# Patient Record
Sex: FEMALE | Race: WHITE | Employment: OTHER | URBAN - METROPOLITAN AREA
[De-identification: names, ages, dates, MRNs, and addresses within clinical notes are randomized per-mention and may not be internally consistent; named-entity substitution may affect disease eponyms.]

---

## 2018-09-17 RX ORDER — SULFAMETHOXAZOLE/TRIMETHOPRIM 800-160 MG
1 TABLET ORAL 2 TIMES DAILY
COMMUNITY

## 2018-09-17 RX ORDER — BETAMETHASONE DIPROPIONATE 0.5 MG/G
CREAM TOPICAL 2 TIMES DAILY
COMMUNITY

## 2018-09-17 RX ORDER — PENICILLIN V POTASSIUM 250 MG/1
250 TABLET, FILM COATED ORAL 4 TIMES DAILY
COMMUNITY
End: 2018-09-17

## 2018-09-17 RX ORDER — FERROUS SULFATE 325(65) MG
325 TABLET ORAL
COMMUNITY

## 2018-09-17 RX ORDER — MULTIVITAMIN,THERAPEUTIC
1 TABLET ORAL DAILY
COMMUNITY

## 2018-09-18 ENCOUNTER — HOSPITAL ENCOUNTER (OUTPATIENT)
Facility: CLINIC | Age: 73
Discharge: HOME | End: 2018-09-18
Attending: DENTIST | Admitting: DENTIST
Payer: MEDICARE

## 2018-09-18 ENCOUNTER — ANESTHESIA EVENT (OUTPATIENT)
Dept: SURGERY | Facility: CLINIC | Age: 73
End: 2018-09-18
Payer: MEDICARE

## 2018-09-18 ENCOUNTER — ANESTHESIA (OUTPATIENT)
Dept: SURGERY | Facility: CLINIC | Age: 73
End: 2018-09-18
Payer: MEDICARE

## 2018-09-18 VITALS
HEIGHT: 66 IN | SYSTOLIC BLOOD PRESSURE: 126 MMHG | OXYGEN SATURATION: 95 % | RESPIRATION RATE: 16 BRPM | DIASTOLIC BLOOD PRESSURE: 63 MMHG | HEART RATE: 63 BPM | BODY MASS INDEX: 42.41 KG/M2 | TEMPERATURE: 98.2 F | WEIGHT: 263.89 LBS

## 2018-09-18 DIAGNOSIS — K14.9 DYSPLASIA OF TONGUE: Primary | ICD-10-CM

## 2018-09-18 LAB
CREAT SERPL-MCNC: 0.95 MG/DL (ref 0.52–1.04)
GFR SERPL CREATININE-BSD FRML MDRD: 58 ML/MIN/1.7M2
GLUCOSE BLDC GLUCOMTR-MCNC: 98 MG/DL (ref 70–99)
POTASSIUM SERPL-SCNC: 4.8 MMOL/L (ref 3.4–5.3)

## 2018-09-18 PROCEDURE — 36415 COLL VENOUS BLD VENIPUNCTURE: CPT | Performed by: ANESTHESIOLOGY

## 2018-09-18 PROCEDURE — 82565 ASSAY OF CREATININE: CPT | Performed by: ANESTHESIOLOGY

## 2018-09-18 PROCEDURE — 25000125 ZZHC RX 250: Performed by: NURSE ANESTHETIST, CERTIFIED REGISTERED

## 2018-09-18 PROCEDURE — 37000009 ZZH ANESTHESIA TECHNICAL FEE, EACH ADDTL 15 MIN: Performed by: DENTIST

## 2018-09-18 PROCEDURE — 25000125 ZZHC RX 250

## 2018-09-18 PROCEDURE — 25000566 ZZH SEVOFLURANE, EA 15 MIN: Performed by: DENTIST

## 2018-09-18 PROCEDURE — A9270 NON-COVERED ITEM OR SERVICE: HCPCS | Mod: GY

## 2018-09-18 PROCEDURE — 25000132 ZZH RX MED GY IP 250 OP 250 PS 637: Mod: GY | Performed by: DENTIST

## 2018-09-18 PROCEDURE — 37000008 ZZH ANESTHESIA TECHNICAL FEE, 1ST 30 MIN: Performed by: DENTIST

## 2018-09-18 PROCEDURE — 25000128 H RX IP 250 OP 636

## 2018-09-18 PROCEDURE — 71000014 ZZH RECOVERY PHASE 1 LEVEL 2 FIRST HR: Performed by: DENTIST

## 2018-09-18 PROCEDURE — 25000128 H RX IP 250 OP 636: Performed by: NURSE ANESTHETIST, CERTIFIED REGISTERED

## 2018-09-18 PROCEDURE — 71000027 ZZH RECOVERY PHASE 2 EACH 15 MINS: Performed by: DENTIST

## 2018-09-18 PROCEDURE — 36000051 ZZH SURGERY LEVEL 2 1ST 30 MIN - UMMC: Performed by: DENTIST

## 2018-09-18 PROCEDURE — 27210794 ZZH OR GENERAL SUPPLY STERILE: Performed by: DENTIST

## 2018-09-18 PROCEDURE — 25000132 ZZH RX MED GY IP 250 OP 250 PS 637: Mod: GY

## 2018-09-18 PROCEDURE — 82962 GLUCOSE BLOOD TEST: CPT

## 2018-09-18 PROCEDURE — 25000128 H RX IP 250 OP 636: Performed by: ANESTHESIOLOGY

## 2018-09-18 PROCEDURE — 88305 TISSUE EXAM BY PATHOLOGIST: CPT | Performed by: DENTIST

## 2018-09-18 PROCEDURE — 36000053 ZZH SURGERY LEVEL 2 EA 15 ADDTL MIN - UMMC: Performed by: DENTIST

## 2018-09-18 PROCEDURE — A9270 NON-COVERED ITEM OR SERVICE: HCPCS | Mod: GY | Performed by: DENTIST

## 2018-09-18 PROCEDURE — 84132 ASSAY OF SERUM POTASSIUM: CPT | Performed by: ANESTHESIOLOGY

## 2018-09-18 PROCEDURE — 40000170 ZZH STATISTIC PRE-PROCEDURE ASSESSMENT II: Performed by: DENTIST

## 2018-09-18 RX ORDER — CEFAZOLIN SODIUM 1 G/3ML
1 INJECTION, POWDER, FOR SOLUTION INTRAMUSCULAR; INTRAVENOUS SEE ADMIN INSTRUCTIONS
Status: DISCONTINUED | OUTPATIENT
Start: 2018-09-18 | End: 2018-09-18 | Stop reason: HOSPADM

## 2018-09-18 RX ORDER — ACETAMINOPHEN 500 MG
500 TABLET ORAL EVERY 4 HOURS PRN
Qty: 30 TABLET | Refills: 0 | Status: SHIPPED | OUTPATIENT
Start: 2018-09-18

## 2018-09-18 RX ORDER — ONDANSETRON 2 MG/ML
INJECTION INTRAMUSCULAR; INTRAVENOUS PRN
Status: DISCONTINUED | OUTPATIENT
Start: 2018-09-18 | End: 2018-09-18

## 2018-09-18 RX ORDER — FENTANYL CITRATE 50 UG/ML
INJECTION, SOLUTION INTRAMUSCULAR; INTRAVENOUS PRN
Status: DISCONTINUED | OUTPATIENT
Start: 2018-09-18 | End: 2018-09-18

## 2018-09-18 RX ORDER — MEPERIDINE HYDROCHLORIDE 50 MG/ML
12.5 INJECTION INTRAMUSCULAR; INTRAVENOUS; SUBCUTANEOUS
Status: DISCONTINUED | OUTPATIENT
Start: 2018-09-18 | End: 2018-09-18 | Stop reason: HOSPADM

## 2018-09-18 RX ORDER — NALOXONE HYDROCHLORIDE 0.4 MG/ML
.1-.4 INJECTION, SOLUTION INTRAMUSCULAR; INTRAVENOUS; SUBCUTANEOUS
Status: DISCONTINUED | OUTPATIENT
Start: 2018-09-18 | End: 2018-09-18 | Stop reason: HOSPADM

## 2018-09-18 RX ORDER — IBUPROFEN 800 MG/1
800 TABLET, FILM COATED ORAL EVERY 6 HOURS PRN
Qty: 30 TABLET | Refills: 0 | Status: SHIPPED | OUTPATIENT
Start: 2018-09-18

## 2018-09-18 RX ORDER — SODIUM CHLORIDE, SODIUM LACTATE, POTASSIUM CHLORIDE, CALCIUM CHLORIDE 600; 310; 30; 20 MG/100ML; MG/100ML; MG/100ML; MG/100ML
INJECTION, SOLUTION INTRAVENOUS CONTINUOUS
Status: DISCONTINUED | OUTPATIENT
Start: 2018-09-18 | End: 2018-09-18 | Stop reason: HOSPADM

## 2018-09-18 RX ORDER — CHLORHEXIDINE GLUCONATE ORAL RINSE 1.2 MG/ML
SOLUTION DENTAL PRN
Status: DISCONTINUED | OUTPATIENT
Start: 2018-09-18 | End: 2018-09-18 | Stop reason: HOSPADM

## 2018-09-18 RX ORDER — ONDANSETRON 4 MG/1
4 TABLET, ORALLY DISINTEGRATING ORAL EVERY 30 MIN PRN
Status: DISCONTINUED | OUTPATIENT
Start: 2018-09-18 | End: 2018-09-18 | Stop reason: HOSPADM

## 2018-09-18 RX ORDER — CHLORHEXIDINE GLUCONATE ORAL RINSE 1.2 MG/ML
10 SOLUTION DENTAL ONCE
Status: COMPLETED | OUTPATIENT
Start: 2018-09-18 | End: 2018-09-18

## 2018-09-18 RX ORDER — PROPOFOL 10 MG/ML
INJECTION, EMULSION INTRAVENOUS PRN
Status: DISCONTINUED | OUTPATIENT
Start: 2018-09-18 | End: 2018-09-18

## 2018-09-18 RX ORDER — LIDOCAINE 40 MG/G
CREAM TOPICAL
Status: DISCONTINUED | OUTPATIENT
Start: 2018-09-18 | End: 2018-09-18 | Stop reason: HOSPADM

## 2018-09-18 RX ORDER — LABETALOL HYDROCHLORIDE 5 MG/ML
10 INJECTION, SOLUTION INTRAVENOUS
Status: DISCONTINUED | OUTPATIENT
Start: 2018-09-18 | End: 2018-09-18 | Stop reason: HOSPADM

## 2018-09-18 RX ORDER — OXYCODONE HYDROCHLORIDE 5 MG/1
5-10 TABLET ORAL EVERY 6 HOURS PRN
Qty: 16 TABLET | Refills: 0 | Status: SHIPPED | OUTPATIENT
Start: 2018-09-18

## 2018-09-18 RX ORDER — DEXAMETHASONE SODIUM PHOSPHATE 4 MG/ML
INJECTION, SOLUTION INTRA-ARTICULAR; INTRALESIONAL; INTRAMUSCULAR; INTRAVENOUS; SOFT TISSUE PRN
Status: DISCONTINUED | OUTPATIENT
Start: 2018-09-18 | End: 2018-09-18

## 2018-09-18 RX ORDER — ONDANSETRON 2 MG/ML
4 INJECTION INTRAMUSCULAR; INTRAVENOUS EVERY 30 MIN PRN
Status: DISCONTINUED | OUTPATIENT
Start: 2018-09-18 | End: 2018-09-18 | Stop reason: HOSPADM

## 2018-09-18 RX ORDER — CEFAZOLIN SODIUM 2 G/100ML
2 INJECTION, SOLUTION INTRAVENOUS
Status: COMPLETED | OUTPATIENT
Start: 2018-09-18 | End: 2018-09-18

## 2018-09-18 RX ORDER — LIDOCAINE HYDROCHLORIDE 20 MG/ML
INJECTION, SOLUTION INFILTRATION; PERINEURAL PRN
Status: DISCONTINUED | OUTPATIENT
Start: 2018-09-18 | End: 2018-09-18

## 2018-09-18 RX ORDER — HYDROMORPHONE HYDROCHLORIDE 1 MG/ML
.3-.5 INJECTION, SOLUTION INTRAMUSCULAR; INTRAVENOUS; SUBCUTANEOUS EVERY 10 MIN PRN
Status: DISCONTINUED | OUTPATIENT
Start: 2018-09-18 | End: 2018-09-18 | Stop reason: HOSPADM

## 2018-09-18 RX ADMIN — LIDOCAINE HYDROCHLORIDE 100 MG: 20 INJECTION, SOLUTION INFILTRATION; PERINEURAL at 13:13

## 2018-09-18 RX ADMIN — CEFAZOLIN SODIUM 2 G: 2 INJECTION, SOLUTION INTRAVENOUS at 13:30

## 2018-09-18 RX ADMIN — MIDAZOLAM 1 MG: 1 INJECTION INTRAMUSCULAR; INTRAVENOUS at 13:04

## 2018-09-18 RX ADMIN — MIDAZOLAM 1 MG: 1 INJECTION INTRAMUSCULAR; INTRAVENOUS at 13:11

## 2018-09-18 RX ADMIN — DEXAMETHASONE SODIUM PHOSPHATE 4 MG: 4 INJECTION, SOLUTION INTRA-ARTICULAR; INTRALESIONAL; INTRAMUSCULAR; INTRAVENOUS; SOFT TISSUE at 13:13

## 2018-09-18 RX ADMIN — PROPOFOL 130 MG: 10 INJECTION, EMULSION INTRAVENOUS at 13:13

## 2018-09-18 RX ADMIN — FENTANYL CITRATE 25 MCG: 50 INJECTION, SOLUTION INTRAMUSCULAR; INTRAVENOUS at 13:13

## 2018-09-18 RX ADMIN — ROCURONIUM BROMIDE 50 MG: 10 INJECTION INTRAVENOUS at 13:13

## 2018-09-18 RX ADMIN — ONDANSETRON 4 MG: 2 INJECTION INTRAMUSCULAR; INTRAVENOUS at 14:02

## 2018-09-18 RX ADMIN — CHLORHEXIDINE GLUCONATE 10 ML: 1.2 RINSE ORAL at 12:38

## 2018-09-18 RX ADMIN — SODIUM CHLORIDE, POTASSIUM CHLORIDE, SODIUM LACTATE AND CALCIUM CHLORIDE: 600; 310; 30; 20 INJECTION, SOLUTION INTRAVENOUS at 13:04

## 2018-09-18 RX ADMIN — SUGAMMADEX 200 MG: 100 INJECTION, SOLUTION INTRAVENOUS at 14:02

## 2018-09-18 RX ADMIN — FENTANYL CITRATE 50 MCG: 50 INJECTION, SOLUTION INTRAMUSCULAR; INTRAVENOUS at 13:36

## 2018-09-18 NOTE — BRIEF OP NOTE
[facility], [place]    Brief Operative Note    Pre-operative diagnosis: Dysplasia of Right Lateral Tongue   Post-operative diagnosis * No post-op diagnosis entered *  Procedure: Procedure(s):  Excision Biopsy Right Lateral Tongue  - Wound Class: II-Clean Contaminated  Surgeon: Surgeon(s) and Role:     * Bulmaro Hutchins DDS - Primary     * Fransisco Castillo MD  Anesthesia: General   Estimated blood loss: Less than 10 ml  Drains: None  Specimens:   ID Type Source Tests Collected by Time Destination   A : Right Lateral tongue Tissue Tongue SURGICAL PATHOLOGY EXAM Bulmaro Hutchins DDS 9/18/2018  1:48 PM      Findings:   None.  Complications: None.  Implants: None.

## 2018-09-18 NOTE — OP NOTE
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      DRAINS:  None.      IMPLANTS:  None      INDICATIONS FOR OPERATION: Gege Lemon is a 73 year old woman with history of epithelial dysplasia of the right lateral border of the tongue who was referred to the [facility] clinic for definitive treatment.  She has previously had the lesion biopsied with subsequent CO2 laser ablation two times. After evaluation of the patient clinically as well as radiographically, it was recommended to the patient that he be taken to the operating room under general anesthesia for extraction of her teeth. The risks and benefits of the procedure were extensively discussed with the patient including but not limited to, bleeding, bruising, postoperative pain, infection, damage to the lingual nerve, damage to adjacent structures, and need for additional procedures in the future. After a thorough discussion of the risks and benefits, the patient expressed understanding and consented to the procedure.       DESCRIPTION OF PROCEDURE:  The patient was met in the preoperative holding area on 9/18/18.  Again, the risks and benefits were discussed with the patient and signed written and verbal consent was obtained.  The patient was then taken to the operating room #3 by the anesthesia service.  The patient was then transferred from the hospitals to the  hospital bed and placed in supine position.  The patient was appropriately padded.  All standard ASA monitors were applied.  The patient then was then induced by the Anesthesia service and an oral endotracheal tube was placed without complication.  Airway was confirmed by the anesthesiologist by end tidal CO2, fog in the tube, and bilateral auscultation of lungs. The airway was secured by the Oral and Maxillofacial Surgery service. Then a mini time-out was conducted and then the patient was prepped, cleaned the oral cavity with chlorhexidine rinse, and received local anesthesia as previously mentioned. Surgeons left to scrub and returned to don sterile gown and gloves. The patient was prepped in standard fashion. A formal time-out was conducted per [facility] protocols.        Attention was directed to the right lateral border of the tongue.  Lesion appeared as a white plaque with irregular borders, non tender to palpation, measuring approximately 4cm x 2cm.  Tongue retracted laterally.  15 blade used to make ellipse incision through mucosa only encompassing lesion, distal of lesion was elevated and lesion was excised from tongue and placed in a formalin container for permanent histologic analysis.  CO2 laser was set to 5 spann continuous.  Intraoral mucosa packed with moist gauze, face covered with wet towels.  Laser taken off standby, laser used to ablate right lateral border of tongue within margins of ellipse.  Laser was coursed over lesion three times in different orientations.  After each complete pass, eschar was removed.  Eschar was left in place on last attempt to aid in hemostasis.  At conclusion of procedure site was copiously irrigated with sterile saline and noted to be hemostatic.      The patient was then turned back over to the anesthesia service where the patient was extubated without complications and transferred to the post anesthesia care unit in stable condition. All  counts were correct.      Dr. Hutchins was present and scrubbed for the entire procedure       Fransisco Castillo DDS

## 2018-09-18 NOTE — ANESTHESIA POSTPROCEDURE EVALUATION
Patient: Gege Lemon    Procedure(s):  Excision Biopsy Right Lateral Tongue with CO2 laser. - Wound Class: II-Clean Contaminated    Diagnosis:Dysplasia of Right Lateral Tongue   Diagnosis Additional Information: No value filed.    Anesthesia Type:  General, ETT    Note:  Anesthesia Post Evaluation    Patient location during evaluation: PACU  Patient participation: Able to fully participate in evaluation  Level of consciousness: awake and alert  Pain management: adequate  Airway patency: patent  Cardiovascular status: acceptable  Respiratory status: acceptable  Hydration status: acceptable  PONV: none     Anesthetic complications: None          Last vitals:  Vitals:    09/18/18 1415 09/18/18 1430 09/18/18 1445   BP: (!) 173/91 154/76 146/70   Pulse: 63     Resp: 14 16 18   Temp: 36.8  C (98.2  F)  36.9  C (98.4  F)   SpO2: 97% 96% 93%         Electronically Signed By: Diana Herron MD  September 18, 2018  3:06 PM

## 2018-09-18 NOTE — ANESTHESIA PREPROCEDURE EVALUATION
Anesthesia Evaluation     . Pt has had prior anesthetic. Type: General and Regional    No history of anesthetic complications          ROS/MED HX    ENT/Pulmonary:     (+)sleep apnea, doesn't use CPAP uses dental device to extend jaw cmH2O , . .    Neurologic:  - neg neurologic ROS     Cardiovascular:     (+) hypertension (on atenolol)----. : . . . :. .       METS/Exercise Tolerance:     Hematologic:  - neg hematologic  ROS       Musculoskeletal:   (+) , , other musculoskeletal- bilateral knee replacements      GI/Hepatic:     (+) hiatal hernia,       Renal/Genitourinary:         Endo:         Psychiatric:  - neg psychiatric ROS       Infectious Disease:  - neg infectious disease ROS       Malignancy:      - no malignancy   Other:    - neg other ROS                 Physical Exam  Normal systems: pulmonary and dental    Airway   Mallampati: III  TM distance: >3 FB  Neck ROM: full    Dental     Cardiovascular   Rhythm and rate: regular and normal      Pulmonary                     Anesthesia Plan      History & Physical Review  History and physical reviewed and following examination; no interval change.    ASA Status:  3 .    NPO Status:  > 2 hours    Plan for General and ETT with Intravenous induction. Maintenance will be Balanced.           Postoperative Care  Postoperative pain management:  Multi-modal analgesia.      Consents  Anesthetic plan, risks, benefits and alternatives discussed with:  Patient..                          .

## 2018-09-18 NOTE — IP AVS SNAPSHOT
MRN:[id number]                      After Visit Summary   9/18/2018    Gege Lemon    MRN: [id number]           Thank you!     Thank you for choosing [facility] for your care. Our goal is always to provide you with excellent care. Hearing back from our patients is one way we can continue to improve our services. Please take a few minutes to complete the written survey that you may receive in the mail after you visit with us. Thank you!        Patient Information     Date Of Birth          1945        About your hospital stay     You were admitted on:  September 18, 2018 You last received care in the:  Same Day Surgery [facility]    You were discharged on:  September 18, 2018       Who to Call     For medical emergencies, please call 911.  For non-urgent questions about your medical care, please call your primary care provider or clinic, [phone]  For questions related to your surgery, please call your surgery clinic        Attending Provider     Provider Specialty    Bulmaro Hutchins DDS Oral Surgery       Primary Care Provider Office Phone # Fax #    Gege Dueñas [phone]      After Care Instructions     Discharge Instructions       HOME CARE INSTRUCTIONS FOLLOWING SURGERY    CARE OF THE MOUTH    1. WOUND CARE: Do not disturb the wound. Ok to rinse mouth with viscous lidocaine for comfort. If you smoke, please refrain from doing so for at least 4 days. Avoid probing the wound. A waterpick should not be used during the early healing period. The above activities will cause complications with wound healing.     2. SWELLING: Facial swelling occurs following most dental extractions and oral surgery procedures. To help minimize swelling, apply an ice pack to the face for 20 minutes; remove for 20 minutes; alternating back and forth throughout the first day after surgery. To be most effective, the applications of ice packs should begin as soon as possible. The maximum  facial swelling normally occurs on days 2-5 following surgery. Once present, it can remain swollen for 7-10 days after surgery.     3. PAIN: A variable amount of pain follows most extractions or oral surgical procedures. If you are given a prescription for pain medication please use as directed. Many times analgesics are prescribed on a scheduled basis. Excessive or increased pain after the third day following surgery is not normal. Should this occur, please call the clinic and set up a follow up appointment if not already scheduled.     4. BLEEDING: A slight amount of bleeding or oozing is expected up to 24 hours after surgery. Theses conditions are no cause for alarm. Following your oral surgery, a small sterile gauze compress may be placed on the wound and we ask that you maintain steady biting pressure on the gauze for 1 hour.      5. NAUSEA: Nausea is not an uncommon event after surgery, and it is sometimes cause by narcotic pain medication. Nausea may be reduce by taking pills with food or water. Attempt to keep drinking small amounts of clear fluids if you find the nausea does not improve. Cola drinks with less carbonation may help with nausea.     6. DISCOLORATION: Facial discoloration (black and blue bruising) often follows many extraction and oral surgery procedures. Discoloration is normal and is no cause for alarm. It may persist for several weeks.     7. JAW STIFFNESS: For several days following most oral procedures, the jaw may become somewhat stiff. Should jaw stiffness progress or persist after one week, notify you oral surgeon.     8. DIET: Soft diet recommended for first day, then may advance as tolerated. It is extremely important to maintain adequate hydration for normal healing. Examples of soft foods include: masked potatoes, soups, applesauce, jell-o, ice cream, overcooked pasta.     Please use the Internet to look up liquid diets to help with ideas     10. PHYSICAL ACTIVITY/REST: Keep  physical activity to a minimum. Avoid athletic and strenuous activities and get plenty of rest.    11. STICHES: No sutures were placed     13. BRUSHING: Resume your normal oral hygiene routine within 24 hours following surgery. Soreness ans swelling may not permit vigorous brushing of all areas, but please make every effort to clean your teeth within comfort.     14. NUMBNESS: Local anesthetics may be effective for as long as 24-48 hours. Should you experience numbness beyone this period, notify your oral surgeon.       AFTER HOURS CONTACT FOR EMERGENCIES:  Please call the [facility] switchboard at [phone] and ask to have the Oral and Maxillofacial Surgery Resident On-call paged.     It is our desire to make your recovery as smooth as possible. These instructions are given to assist you following your surgery. If you have any questions please call the clinic at [phone].                  Further instructions from your care team       [facility]  Same-Day Surgery   Adult Discharge Orders & Instructions     For 24 hours after surgery    1. Get plenty of rest.  A responsible adult must stay with you for at least 24 hours after you leave the hospital.   2. Do not drive or use heavy equipment.  If you have weakness or tingling, don't drive or use heavy equipment until this feeling goes away.  3. Do not drink alcohol.  4. Avoid strenuous or risky activities.  Ask for help when climbing stairs.   5. You may feel lightheaded.  IF so, sit for a few minutes before standing.  Have someone help you get up.   6. If you have nausea (feel sick to your stomach): Drink only clear liquids such as apple juice, ginger ale, broth or 7-Up.  Rest may also help.  Be sure to drink enough fluids.  Move to a regular diet as you feel able.  7. You may have a slight fever. Call the doctor if your fever is over 100 F (37.7 C) (taken under the tongue) or lasts longer than 24 hours.  8. You  "may have a dry mouth, a sore throat, muscle aches or trouble sleeping.  These should go away after 24 hours.  9. Do not make important or legal decisions.   Call your doctor for any of the followin.  Signs of infection (fever, growing tenderness at the surgery site, a large amount of drainage or bleeding, severe pain, foul-smelling drainage, redness, swelling).    2. It has been over 8 to 10 hours since surgery and you are still not able to urinate (pass water).    3.  Headache for over 24 hours.    4.  Numbness, tingling or weakness the day after surgery (if you had spinal anesthesia).  To contact a doctor, call  Dr. Hutchins at the Dental Clinic @ [phone] or:        [phone] and ask for the resident on call for         Oral Surgery (answered 24 hours a day)      Emergency Department:    [facility]: [phone]       (TTY for hearing impaired: [phone])             Tips for taking pain medications  To get the best pain relief possible , remember these points:      Take pain medications as directed, before pain becomes severe      Pain medication can upset your stomach: taking it with food may help      Constipation is a common side effect of pain medication. Drink plenty of  Fluids      Eat foods high in fiber. Take a stool softener  if recommended by your doctor or  Pharmacist.        Do not drink alcohol, drive or operate machinery while taking pain medications.      Ask about other ways to control pain, such as with heat, ice or relaxation.          Pending Results     Date and Time Order Name Status Description    2018 1350 Surgical pathology exam In process             Admission Information     Date & Time Provider Department Dept. Phone    2018 Bulmaro Hutchins DDS Same Day Surgery [facility] [phone]      Your Vitals Were     Blood Pressure Pulse Temperature Respirations Height Weight    138/70 63 98.2  F (36.8  C) (Oral) 16 1.676 m (5' 6\") 119.7 kg (263 lb 14.3 oz) " "   Pulse Oximetry BMI (Body Mass Index)                92% 42.59 kg/m2          [employer] Information     [employer] lets you send messages to your doctor, view your test results, renew your prescriptions, schedule appointments and more. To sign up, go to [email or web address] . Click on \"Log in\" on the left side of the screen, which will take you to the Welcome page. Then click on \"Sign up Now\" on the right side of the page.     You will be asked to enter the access code listed below, as well as some personal information. Please follow the directions to create your username and password.     Your access code is: [email or web address]  Expires: 2018  3:28 PM     Your access code will  in 90 days. If you need help or a new code, please call your [facility] clinic or [phone].        Care EveryWhere ID     This is your Care EveryWhere ID. This could be used by other organizations to access your [facility] medical records  [id number]        Equal Access to Services     MANJO HARRIS : Hadtheresa talleyo Somani, waaxda luqadaha, qaybta kaalmada aderuby, jesus mckeon . So [facility] [phone].    ATENCIÓN: Si habla español, tiene a curiel disposición servicios gratuitos de asistencia lingüística. Llame al [phone].    We comply with applicable federal civil rights laws and [place] laws. We do not discriminate on the basis of race, color, national origin, age, disability, sex, sexual orientation, or gender identity.               Review of your medicines      START taking        Dose / Directions    acetaminophen 500 MG tablet   Commonly known as:  TYLENOL   Used for:  Dysplasia of tongue        Dose:  500 mg   Take 1 tablet (500 mg) by mouth every 4 hours as needed for pain or fever (mild pain or fever)   Quantity:  30 tablet   Refills:  0       ibuprofen 800 MG tablet   Commonly known as:  ADVIL/MOTRIN   Used for:  Dysplasia of tongue        Dose:  800 mg   Take 1 tablet (800 mg) by " mouth every 6 hours as needed for pain or fever (mild to moderate pain, or temperature greater than 102 F)   Quantity:  30 tablet   Refills:  0       lidocaine (viscous) 2 % solution   Commonly known as:  XYLOCAINE   Used for:  Dysplasia of tongue        Dose:  15 mL   Take 15 mLs by mouth every 2 hours as needed for moderate pain swish and spit every 3-8 hours as needed; max 8 doses/24 hour period   Quantity:  100 mL   Refills:  3       oxyCODONE IR 5 MG tablet   Commonly known as:  ROXICODONE   Used for:  Dysplasia of tongue        Dose:  5-10 mg   Take 1-2 tablets (5-10 mg) by mouth every 6 hours as needed for pain (moderate to severe pain)   Quantity:  16 tablet   Refills:  0         CONTINUE these medicines which have NOT CHANGED        Dose / Directions    ATENOLOL PO        Dose:  50 mg   Take 50 mg by mouth daily   Refills:  0       augmented betamethasone dipropionate 0.05 % cream   Commonly known as:  DIPROLENE-AF        Apply topically 2 times daily   Refills:  0       ferrous sulfate 325 (65 Fe) MG tablet   Commonly known as:  IRON        Dose:  325 mg   Take 325 mg by mouth daily (with breakfast)   Refills:  0       FLUCONAZOLE PO        Dose:  200 mg   Take 200 mg by mouth daily One every 3 days   Refills:  0       multivitamin, therapeutic Tabs tablet        Dose:  1 tablet   Take 1 tablet by mouth daily   Refills:  0       OMEPRAZOLE PO        Dose:  20 mg   Take 20 mg by mouth daily   Refills:  0       RESTORIL PO        Dose:  15 mg   Take 15 mg by mouth At Bedtime   Refills:  0       sulfamethoxazole-trimethoprim 800-160 MG per tablet   Commonly known as:  BACTRIM DS/SEPTRA DS        Dose:  1 tablet   Take 1 tablet by mouth 2 times daily   Refills:  0       VITAMIN B6 PO        Refills:  0            Where to get your medicines      Some of these will need a paper prescription and others can be bought over the counter. Ask your nurse if you have questions.     Bring a paper prescription for each  of these medications     acetaminophen 500 MG tablet    ibuprofen 800 MG tablet    lidocaine (viscous) 2 % solution    oxyCODONE IR 5 MG tablet                Protect others around you: Learn how to safely use, store and throw away your medicines at www.disposemymeds.org.        ANTIBIOTIC INSTRUCTION     You've Been Prescribed an Antibiotic - Now What?  Your healthcare team thinks that you or your loved one might have an infection. Some infections can be treated with antibiotics, which are powerful, life-saving drugs. Like all medications, antibiotics have side effects and should only be used when necessary. There are some important things you should know about your antibiotic treatment.      Your healthcare team may run tests before you start taking an antibiotic.    Your team may take samples (e.g., from your blood, urine or other areas) to run tests to look for bacteria. These test can be important to determine if you need an antibiotic at all and, if you do, which antibiotic will work best.      Within a few days, your healthcare team might change or even stop your antibiotic.    Your team may start you on an antibiotic while they are working to find out what is making you sick.    Your team might change your antibiotic because test results show that a different antibiotic would be better to treat your infection.    In some cases, once your team has more information, they learn that you do not need an antibiotic at all. They may find out that you don't have an infection, or that the antibiotic you're taking won't work against your infection. For example, an infection caused by a virus can't be treated with antibiotics. Staying on an antibiotic when you don't need it is more likely to be harmful than helpful.      You may experience side effects from your antibiotic.    Like all medications, antibiotics have side effects. Some of these can be serious.    Let you healthcare team know if you have any known  allergies when you are admitted to the hospital.    One significant side effect of nearly all antibiotics is the risk of severe and sometimes deadly diarrhea caused by Clostridium difficile (C. Difficile). This occurs when a person takes antibiotics because some good germs are destroyed. Antibiotic use allows C. diificile to take over, putting patients at high risk for this serious infection.    As a patient or caregiver, it is important to understand your or your loved one's antibiotic treatment. It is especially important for caregivers to speak up when patients can't speak for themselves. Here are some important questions to ask your healthcare team.    What infection is this antibiotic treating and how do you know I have that infection?    What side effects might occur from this antibiotic?    How long will I need to take this antibiotic?    Is it safe to take this antibiotic with other medications or supplements (e.g., vitamins) that I am taking?     Are there any special directions I need to know about taking this antibiotic? For example, should I take it with food?    How will I be monitored to know whether my infection is responding to the antibiotic?    What tests may help to make sure the right antibiotic is prescribed for me?      Information provided by:  www.cdc.gov/getsmart  U.S. Department of Health and Human Services  Centers for disease Control and Prevention  National Center for Emerging and Zoonotic Infectious Diseases  Division of Healthcare Quality Promotion        Information about OPIOIDS     PRESCRIPTION OPIOIDS: WHAT YOU NEED TO KNOW   We gave you an opioid (narcotic) pain medicine. It is important to manage your pain, but opioids are not always the best choice. You should first try all the other options your care team gave you. Take this medicine for as short a time (and as few doses) as possible.    Some activities can increase your pain, such as bandage changes or therapy sessions. It may  help to take your pain medicine 30 to 60 minutes before these activities. Reduce your stress by getting enough sleep, working on hobbies you enjoy and practicing relaxation or meditation. Talk to your care team about ways to manage your pain beyond prescription opioids.    These medicines have risks:    DO NOT drive when on new or higher doses of pain medicine. These medicines can affect your alertness and reaction times, and you could be arrested for driving under the influence (DUI). If you need to use opioids long-term, talk to your care team about driving.    DO NOT operate heavy machinery    DO NOT do any other dangerous activities while taking these medicines.    DO NOT drink any alcohol while taking these medicines.     If the opioid prescribed includes acetaminophen, DO NOT take with any other medicines that contain acetaminophen. Read all labels carefully. Look for the word  acetaminophen  or  Tylenol.  Ask your pharmacist if you have questions or are unsure.    You can get addicted to pain medicines, especially if you have a history of addiction (chemical, alcohol or substance dependence). Talk to your care team about ways to reduce this risk.    All opioids tend to cause constipation. Drink plenty of water and eat foods that have a lot of fiber, such as fruits, vegetables, prune juice, apple juice and high-fiber cereal. Take a laxative (Miralax, milk of magnesia, Colace, Senna) if you don t move your bowels at least every other day. Other side effects include upset stomach, sleepiness, dizziness, throwing up, tolerance (needing more of the medicine to have the same effect), physical dependence and slowed breathing.    Store your pills in a secure place, locked if possible. We will not replace any lost or stolen medicine. If you don t finish your medicine, please throw away (dispose) as directed by your pharmacist. The [place] Pollution Control Agency has more information about safe disposal:  https://www.pca.[facility].mn.us/living-green/managing-unwanted-medications             Medication List: This is a list of all your medications and when to take them. Check marks below indicate your daily home schedule. Keep this list as a reference.      Medications           Morning Afternoon Evening Bedtime As Needed    acetaminophen 500 MG tablet   Commonly known as:  TYLENOL   Take 1 tablet (500 mg) by mouth every 4 hours as needed for pain or fever (mild pain or fever)                                ATENOLOL PO   Take 50 mg by mouth daily                                augmented betamethasone dipropionate 0.05 % cream   Commonly known as:  DIPROLENE-AF   Apply topically 2 times daily                                ferrous sulfate 325 (65 Fe) MG tablet   Commonly known as:  IRON   Take 325 mg by mouth daily (with breakfast)                                FLUCONAZOLE PO   Take 200 mg by mouth daily One every 3 days                                ibuprofen 800 MG tablet   Commonly known as:  ADVIL/MOTRIN   Take 1 tablet (800 mg) by mouth every 6 hours as needed for pain or fever (mild to moderate pain, or temperature greater than 102 F)                                lidocaine (viscous) 2 % solution   Commonly known as:  XYLOCAINE   Take 15 mLs by mouth every 2 hours as needed for moderate pain swish and spit every 3-8 hours as needed; max 8 doses/24 hour period                                multivitamin, therapeutic Tabs tablet   Take 1 tablet by mouth daily                                OMEPRAZOLE PO   Take 20 mg by mouth daily                                oxyCODONE IR 5 MG tablet   Commonly known as:  ROXICODONE   Take 1-2 tablets (5-10 mg) by mouth every 6 hours as needed for pain (moderate to severe pain)                                RESTORIL PO   Take 15 mg by mouth At Bedtime                                sulfamethoxazole-trimethoprim 800-160 MG per tablet   Commonly known as:  BACTRIM DS/SEPTRA DS    Take 1 tablet by mouth 2 times daily                                VITAMIN B6 PO

## 2018-09-18 NOTE — ANESTHESIA CARE TRANSFER NOTE
"Patient: Gege Lemon    Procedure(s):  Excision Biopsy Right Lateral Tongue  - Wound Class: II-Clean Contaminated    Diagnosis: Dysplasia of Right Lateral Tongue   Diagnosis Additional Information: No value filed.    Anesthesia Type:   General, ETT     Note:  Airway :Face Mask  Patient transferred to:PACU  Comments: Pt transferred to PACU, VSS. /86  Pulse 60  Temp 36.6  C (97.8  F) (Oral)  Resp 12  Ht 1.676 m (5' 6\")  Wt 119.7 kg (263 lb 14.3 oz)  SpO2 93%  BMI 42.59 kg/m2  Mild bleeding from left nare pre and post-extubation, currently resolved. Report to PACU RN.Handoff Report: Identifed the Patient, Identified the Reponsible Provider, Reviewed the pertinent medical history, Discussed the surgical course, Reviewed Intra-OP anesthesia mangement and issues during anesthesia, Set expectations for post-procedure period and Allowed opportunity for questions and acknowledgement of understanding      Vitals: (Last set prior to Anesthesia Care Transfer)    CRNA VITALS  9/18/2018 1341 - 9/18/2018 1417      9/18/2018             Pulse: 75    Ht Rate: 75    SpO2: 97 %    Resp Rate (observed): (!)  79                Electronically Signed By: SCOT Friedman CRNA  September 18, 2018  2:17 PM  "

## 2018-09-18 NOTE — DISCHARGE INSTRUCTIONS
[facility]  Same-Day Surgery   Adult Discharge Orders & Instructions     For 24 hours after surgery    1. Get plenty of rest.  A responsible adult must stay with you for at least 24 hours after you leave the hospital.   2. Do not drive or use heavy equipment.  If you have weakness or tingling, don't drive or use heavy equipment until this feeling goes away.  3. Do not drink alcohol.  4. Avoid strenuous or risky activities.  Ask for help when climbing stairs.   5. You may feel lightheaded.  IF so, sit for a few minutes before standing.  Have someone help you get up.   6. If you have nausea (feel sick to your stomach): Drink only clear liquids such as apple juice, ginger ale, broth or 7-Up.  Rest may also help.  Be sure to drink enough fluids.  Move to a regular diet as you feel able.  7. You may have a slight fever. Call the doctor if your fever is over 100 F (37.7 C) (taken under the tongue) or lasts longer than 24 hours.  8. You may have a dry mouth, a sore throat, muscle aches or trouble sleeping.  These should go away after 24 hours.  9. Do not make important or legal decisions.   Call your doctor for any of the followin.  Signs of infection (fever, growing tenderness at the surgery site, a large amount of drainage or bleeding, severe pain, foul-smelling drainage, redness, swelling).    2. It has been over 8 to 10 hours since surgery and you are still not able to urinate (pass water).    3.  Headache for over 24 hours.    4.  Numbness, tingling or weakness the day after surgery (if you had spinal anesthesia).  To contact a doctor, call  Dr. Hutchins at the Dental Clinic @ [phone] or:        [phone] and ask for the resident on call for         Oral Surgery (answered 24 hours a day)      Emergency Department:    [facility]: [phone]       (TTY for hearing impaired: [phone])             Tips for taking pain medications  To get the best pain relief  possible , remember these points:      Take pain medications as directed, before pain becomes severe      Pain medication can upset your stomach: taking it with food may help      Constipation is a common side effect of pain medication. Drink plenty of  Fluids      Eat foods high in fiber. Take a stool softener  if recommended by your doctor or  Pharmacist.        Do not drink alcohol, drive or operate machinery while taking pain medications.      Ask about other ways to control pain, such as with heat, ice or relaxation.

## 2018-09-18 NOTE — IP AVS SNAPSHOT
[facility] Day [facility] [address] [address]    Phone:  [phone]                                       After Visit Summary   9/18/2018    Gege Lemon    MRN: [id number]           After Visit Summary Signature Page     I have received my discharge instructions, and my questions have been answered. I have discussed any challenges I see with this plan with the nurse or doctor.    ..........................................................................................................................................  Patient/Patient Representative Signature      ..........................................................................................................................................  Patient Representative Print Name and Relationship to Patient    ..................................................               ................................................  Date                                   Time    ..........................................................................................................................................  Reviewed by Signature/Title    ...................................................              ..............................................  Date                                               Time          22EPIC Rev 08/18

## 2018-09-20 LAB — COPATH REPORT: NORMAL

## (undated) DEVICE — PACK NEURO MINOR UMMC SNE32MNMU4

## (undated) DEVICE — DRSG GAUZE 4X4" TRAY 6939

## (undated) DEVICE — BRUSH SURGICAL EZ SCRUB PLAIN 371603

## (undated) DEVICE — TAPE CLOTH 2" CARDINAL 3TRCL02

## (undated) DEVICE — SOL WATER IRRIG 1000ML BOTTLE 2F7114

## (undated) DEVICE — SOL NACL 0.9% IRRIG 1000ML BOTTLE 2F7124

## (undated) DEVICE — EYE DRSG PAD OVAL

## (undated) DEVICE — TUBING SMOKE EVAC 2.2CM WAND SEA3725

## (undated) DEVICE — LINEN TOWEL PACK X5 5464

## (undated) DEVICE — SYR 30ML LL W/O NDL 302832

## (undated) DEVICE — TOOTHBRUSH ADULT NON STERILE MDS136850

## (undated) DEVICE — BLADE KNIFE SURG 15 371115

## (undated) DEVICE — ESU NDL COLORADO MICRO E1651

## (undated) DEVICE — PAD CHUX UNDERPAD 23X24" 7136

## (undated) DEVICE — SYR 10ML FINGER CONTROL W/O NDL 309695

## (undated) DEVICE — ESU GROUND PAD ADULT W/CORD E7507

## (undated) DEVICE — NDL 21GA 1.5"

## (undated) DEVICE — DRSG TELFA 3X8" 1238

## (undated) DEVICE — DRAPE U SPLIT 74X120" 29440

## (undated) DEVICE — GLOVE PROTEXIS POWDER FREE 8.5 ORTHOPEDIC 2D73ET85

## (undated) DEVICE — SU CHROMIC 3-0 FS-2 27" 636

## (undated) DEVICE — SU SILK 2-0 SH 30" K833H

## (undated) DEVICE — NDL 25GA 2"  8881200441

## (undated) DEVICE — PREP POVIDONE IODINE SOLUTION 10% 4OZ

## (undated) DEVICE — LINEN TOWEL PACK X6 WHITE 5487

## (undated) DEVICE — SYR EAR BULB 3OZ 0035830

## (undated) DEVICE — TUBING SMOKE EVAC 2.2CMX3M SEA3715

## (undated) RX ORDER — CEFAZOLIN SODIUM 2 G/100ML
INJECTION, SOLUTION INTRAVENOUS
Status: DISPENSED
Start: 2018-09-18

## (undated) RX ORDER — CHLORHEXIDINE GLUCONATE ORAL RINSE 1.2 MG/ML
SOLUTION DENTAL
Status: DISPENSED
Start: 2018-09-18

## (undated) RX ORDER — BUPIVACAINE HYDROCHLORIDE AND EPINEPHRINE 5; 5 MG/ML; UG/ML
INJECTION, SOLUTION EPIDURAL; INTRACAUDAL; PERINEURAL
Status: DISPENSED
Start: 2018-09-18

## (undated) RX ORDER — GLYCOPYRROLATE 0.2 MG/ML
INJECTION, SOLUTION INTRAMUSCULAR; INTRAVENOUS
Status: DISPENSED
Start: 2018-09-18

## (undated) RX ORDER — PROPOFOL 10 MG/ML
INJECTION, EMULSION INTRAVENOUS
Status: DISPENSED
Start: 2018-09-18

## (undated) RX ORDER — LIDOCAINE HYDROCHLORIDE 20 MG/ML
INJECTION, SOLUTION EPIDURAL; INFILTRATION; INTRACAUDAL; PERINEURAL
Status: DISPENSED
Start: 2018-09-18

## (undated) RX ORDER — FENTANYL CITRATE 50 UG/ML
INJECTION, SOLUTION INTRAMUSCULAR; INTRAVENOUS
Status: DISPENSED
Start: 2018-09-18